# Patient Record
Sex: FEMALE | URBAN - METROPOLITAN AREA
[De-identification: names, ages, dates, MRNs, and addresses within clinical notes are randomized per-mention and may not be internally consistent; named-entity substitution may affect disease eponyms.]

---

## 2024-03-05 ENCOUNTER — LAB (OUTPATIENT)
Dept: URBAN - METROPOLITAN AREA CLINIC 80 | Facility: CLINIC | Age: 59
End: 2024-03-05

## 2024-03-05 ENCOUNTER — OV NP (OUTPATIENT)
Dept: URBAN - METROPOLITAN AREA CLINIC 80 | Facility: CLINIC | Age: 59
End: 2024-03-05
Payer: COMMERCIAL

## 2024-03-05 VITALS
WEIGHT: 143.6 LBS | SYSTOLIC BLOOD PRESSURE: 124 MMHG | DIASTOLIC BLOOD PRESSURE: 72 MMHG | BODY MASS INDEX: 24.65 KG/M2 | HEART RATE: 53 BPM | TEMPERATURE: 97.2 F

## 2024-03-05 DIAGNOSIS — Z80.0 FAMILY HX OF COLON CANCER: ICD-10-CM

## 2024-03-05 PROCEDURE — 99203 OFFICE O/P NEW LOW 30 MIN: CPT | Performed by: PHYSICIAN ASSISTANT

## 2024-03-05 RX ORDER — LEVOTHYROXINE SODIUM 0.17 MG/1
TAKE 1 TABLET BY MOUTH DAILY TABLET ORAL
Qty: 90 EACH | Refills: 0 | Status: ACTIVE | COMMUNITY

## 2024-03-05 RX ORDER — SODIUM, POTASSIUM,MAG SULFATES 17.5-3.13G
AS DIRECTED SOLUTION, RECONSTITUTED, ORAL ORAL AS DIRECTED
Qty: 1 | Refills: 0 | OUTPATIENT
Start: 2024-03-05 | End: 2024-03-06

## 2024-03-05 RX ORDER — OLAPARIB 150 MG/1
TABLET, FILM COATED ORAL
Qty: 120 TABLET | Status: ACTIVE | COMMUNITY

## 2024-03-05 RX ORDER — ATORVASTATIN CALCIUM 20 MG/1
TABLET ORAL
Qty: 90 TABLET | Status: ACTIVE | COMMUNITY

## 2024-03-05 RX ORDER — SERTRALINE 100 MG/1
TABLET, FILM COATED ORAL
Qty: 180 EACH | Refills: 0 | Status: ACTIVE | COMMUNITY

## 2024-03-05 NOTE — HPI-TODAY'S VISIT:
58 year old female presents for colonoscopy consultation.  Last colonoscopy 2/2019 with internal hemorrhoids, otherwise unremarkable. Ovarican cancer diagnosed last year. Underwent total hysterectomy and chemotherapy treatment at that time.  March 2023 was last dose of chemotherapy.  Oncologist: Dr. Beatrice Preciado. Reports daily bowel movements with benefiber and Miralax.  Denies blood in stool and melena.  Denies abdominal pain. Denies blood thinners and frequent use of NSAIDs. Father with colon cancer.

## 2024-03-14 ENCOUNTER — LAB (OUTPATIENT)
Dept: URBAN - METROPOLITAN AREA CLINIC 4 | Facility: CLINIC | Age: 59
End: 2024-03-14
Payer: COMMERCIAL

## 2024-03-14 ENCOUNTER — COLON (OUTPATIENT)
Dept: URBAN - METROPOLITAN AREA SURGERY CENTER 19 | Facility: SURGERY CENTER | Age: 59
End: 2024-03-14
Payer: COMMERCIAL

## 2024-03-14 DIAGNOSIS — D12.2 ADENOMA OF ASCENDING COLON: ICD-10-CM

## 2024-03-14 DIAGNOSIS — Z12.11 ENCOUNTER FOR SCREENING FOR MALIGNANT NEOPLASM OF COLON: ICD-10-CM

## 2024-03-14 DIAGNOSIS — D12.2 BENIGN NEOPLASM OF ASCENDING COLON: ICD-10-CM

## 2024-03-14 DIAGNOSIS — Z80.0 FAMILY HISTORY OF CANCER OF DIGESTIVE ORGAN: ICD-10-CM

## 2024-03-14 PROCEDURE — 88305 TISSUE EXAM BY PATHOLOGIST: CPT | Performed by: PATHOLOGY

## 2024-03-14 PROCEDURE — 45380 COLONOSCOPY AND BIOPSY: CPT | Performed by: INTERNAL MEDICINE

## 2024-03-14 RX ORDER — OLAPARIB 150 MG/1
TABLET, FILM COATED ORAL
Qty: 120 TABLET | Status: ACTIVE | COMMUNITY

## 2024-03-14 RX ORDER — LEVOTHYROXINE SODIUM 0.17 MG/1
TAKE 1 TABLET BY MOUTH DAILY TABLET ORAL
Qty: 90 EACH | Refills: 0 | Status: ACTIVE | COMMUNITY

## 2024-03-14 RX ORDER — ATORVASTATIN CALCIUM 20 MG/1
TABLET ORAL
Qty: 90 TABLET | Status: ACTIVE | COMMUNITY

## 2024-03-14 RX ORDER — SERTRALINE 100 MG/1
TABLET, FILM COATED ORAL
Qty: 180 EACH | Refills: 0 | Status: ACTIVE | COMMUNITY

## 2024-06-27 ENCOUNTER — APPOINTMENT (RX ONLY)
Dept: URBAN - METROPOLITAN AREA CLINIC 159 | Facility: CLINIC | Age: 59
Setting detail: DERMATOLOGY
End: 2024-06-27

## 2024-06-27 DIAGNOSIS — Z41.9 ENCOUNTER FOR PROCEDURE FOR PURPOSES OTHER THAN REMEDYING HEALTH STATE, UNSPECIFIED: ICD-10-CM

## 2024-06-27 PROCEDURE — ? BOTOX

## 2024-06-27 NOTE — PROCEDURE: BOTOX
Additional Area 3 Units: 0
Show Nasal Units: Yes
Periorbital Skin Units: 20
Show Ucl Units: No
Consent: Written consent obtained. Risks include but not limited to lid/brow ptosis, bruising, swelling, diplopia, temporary effect, incomplete chemical denervation.
Additional Area 1 Location: chin
Incrementing Botox Units: By 0.5 Units
Dilution (U/0.1 Cc): 10
Post-Care Instructions: Patient instructed to not lie down for 4 hours and limit physical activity for 24 hours.
Detail Level: Detailed
Forehead Units: 7
Show Inventory Tab: Show
Nasal Root Units: 3

## 2025-01-08 ENCOUNTER — APPOINTMENT (OUTPATIENT)
Dept: URBAN - METROPOLITAN AREA CLINIC 159 | Facility: CLINIC | Age: 60
Setting detail: DERMATOLOGY
End: 2025-01-08

## 2025-01-08 DIAGNOSIS — Z41.9 ENCOUNTER FOR PROCEDURE FOR PURPOSES OTHER THAN REMEDYING HEALTH STATE, UNSPECIFIED: ICD-10-CM

## 2025-01-08 PROCEDURE — ? BOTOX

## 2025-01-08 NOTE — PROCEDURE: BOTOX
Right Pupillary Line Units: 0
Show Nasal Units: Yes
Show Right And Left Brow Units: No
Nasal Root Units: 3
Periorbital Skin Units: 20
Incrementing Botox Units: By 0.5 Units
Additional Area 1 Location: chin
Consent: Written consent obtained. Risks include but not limited to lid/brow ptosis, bruising, swelling, diplopia, temporary effect, incomplete chemical denervation.
Post-Care Instructions: Patient instructed to not lie down for 4 hours and limit physical activity for 24 hours.
Dilution (U/0.1 Cc): 10
Detail Level: Detailed
Forehead Units: 7
Show Inventory Tab: Show

## 2025-06-26 ENCOUNTER — APPOINTMENT (OUTPATIENT)
Dept: URBAN - METROPOLITAN AREA CLINIC 159 | Facility: CLINIC | Age: 60
Setting detail: DERMATOLOGY
End: 2025-06-26

## 2025-06-26 DIAGNOSIS — Z41.9 ENCOUNTER FOR PROCEDURE FOR PURPOSES OTHER THAN REMEDYING HEALTH STATE, UNSPECIFIED: ICD-10-CM

## 2025-06-26 PROCEDURE — ? BOTOX

## 2025-06-26 NOTE — PROCEDURE: BOTOX
Additional Area 5 Units: 0
Periorbital Skin Units: 20
Show Orbicularis Oculi Units: Yes
Nasal Root Units: 3
Additional Area 1 Location: chin
Show Right And Left Pupillary Line Units: No
Incrementing Botox Units: By 0.5 Units
Dilution (U/0.1 Cc): 10
Detail Level: Detailed
Forehead Units: 7
Glabellar Complex Units: 30
Show Inventory Tab: Show
Consent: Written consent obtained. Risks include but not limited to lid/brow ptosis, bruising, swelling, diplopia, temporary effect, incomplete chemical denervation.
Post-Care Instructions: Patient instructed to not lie down for 4 hours and limit physical activity for 24 hours.

## 2025-08-18 ENCOUNTER — P2P PATIENT RECORD (OUTPATIENT)
Age: 60
End: 2025-08-18